# Patient Record
Sex: FEMALE | Race: WHITE | URBAN - METROPOLITAN AREA
[De-identification: names, ages, dates, MRNs, and addresses within clinical notes are randomized per-mention and may not be internally consistent; named-entity substitution may affect disease eponyms.]

---

## 2024-06-03 ENCOUNTER — OFFICE VISIT (OUTPATIENT)
Age: 29
End: 2024-06-03

## 2024-06-03 VITALS
SYSTOLIC BLOOD PRESSURE: 105 MMHG | DIASTOLIC BLOOD PRESSURE: 70 MMHG | WEIGHT: 170 LBS | HEIGHT: 62 IN | HEART RATE: 79 BPM | RESPIRATION RATE: 18 BRPM | BODY MASS INDEX: 31.28 KG/M2 | TEMPERATURE: 98.7 F | OXYGEN SATURATION: 98 %

## 2024-06-03 DIAGNOSIS — S99.921A FOOT INJURY, RIGHT, INITIAL ENCOUNTER: ICD-10-CM

## 2024-06-03 DIAGNOSIS — T14.90XA INJURY: Primary | ICD-10-CM

## 2024-06-03 NOTE — PROGRESS NOTES
Gregoria Ashby (:  1995) is a 28 y.o. female,New patient, here for evaluation of the following chief complaint(s):  Ankle Injury (Right lateral ankle pain - jumped off truck bed last night and landed wrong)      ASSESSMENT/PLAN:  Visit Diagnoses and Associated Orders       Injury    -  Primary    XR TIBIA FIBULA RIGHT (2 VIEWS) [68142 CPT(R)]   - Future Order    XR FOOT RIGHT (MIN 3 VIEWS) [13386 CPT(R)]   - Future Order    [facility] Other (specify in comments) [[id number] Custom]           Foot injury, right, initial encounter             ORDERS WITHOUT AN ASSOCIATED DIAGNOSIS    Escitalopram Oxalate (LEXAPRO PO) [58954]      Drospirenone-Estetrol (NEXTSTELLIS PO) [038300]             Follow up in 24-72 hours with Ortho     SUBJECTIVE/OBJECTIVE:      28 y.o. female presents with symptoms of   RLE pain, R ankle swelling, change in mobility for the past day. She states she jumped out of the back of a truck and did not land correctly. She has pain that radiates up towards her knee in addition to ankle swelling. Area is erythematous and edematous.          Vitals:    24 1329   BP: 105/70   Site: Left Upper Arm   Position: Sitting   Cuff Size: Medium Adult   Pulse: 79   Resp: 18   Temp: 98.7 °F (37.1 °C)   TempSrc: Temporal   SpO2: 98%   Weight: 77.1 kg (170 lb)   Height: 1.575 m (5' 2\")       No results found for this visit on 24.     Physical Exam  Vitals and nursing note reviewed.   Constitutional:       General: She is not in acute distress.     Appearance: Normal appearance.   HENT:      Head: Normocephalic and atraumatic.      Right Ear: Tympanic membrane, ear canal and external ear normal.      Left Ear: Tympanic membrane, ear canal and external ear normal.      Nose: Nose normal. No congestion.      Mouth/Throat:      Mouth: Mucous membranes are moist.      Pharynx: Oropharynx is clear. No oropharyngeal exudate or posterior oropharyngeal erythema.   Eyes:      Extraocular